# Patient Record
Sex: FEMALE | Race: WHITE | NOT HISPANIC OR LATINO | Employment: OTHER | URBAN - METROPOLITAN AREA
[De-identification: names, ages, dates, MRNs, and addresses within clinical notes are randomized per-mention and may not be internally consistent; named-entity substitution may affect disease eponyms.]

---

## 2020-06-01 PROBLEM — Z01.419 WELL WOMAN EXAM: Chronic | Status: ACTIVE | Noted: 2020-06-01

## 2023-05-12 ENCOUNTER — OFFICE VISIT (OUTPATIENT)
Dept: OBSTETRICS AND GYNECOLOGY | Facility: CLINIC | Age: 63
End: 2023-05-12
Payer: COMMERCIAL

## 2023-05-12 VITALS — WEIGHT: 124 LBS | BODY MASS INDEX: 21.28 KG/M2 | SYSTOLIC BLOOD PRESSURE: 90 MMHG | DIASTOLIC BLOOD PRESSURE: 70 MMHG

## 2023-05-12 DIAGNOSIS — Z12.31 BREAST CANCER SCREENING BY MAMMOGRAM: ICD-10-CM

## 2023-05-12 DIAGNOSIS — N39.41 URGE INCONTINENCE: ICD-10-CM

## 2023-05-12 DIAGNOSIS — Z01.419 WELL WOMAN EXAM WITH ROUTINE GYNECOLOGICAL EXAM: Primary | ICD-10-CM

## 2023-05-12 NOTE — PROGRESS NOTES
Subjective   Chief Complaint   Patient presents with   • Annual Exam     No c/c     Azeem Diego is a 62 y.o. year old  menopausal female presenting to be seen for her annual exam.  This past year she has not been on hormone replacement therapy.  There has not been vaginal bleeding in the last 12 months.  Menopausal symptoms are not present.    SEXUAL Hx:  She is not currently sexually active.  In the past year there she has not been sexually active.    Condoms are not needed because she is not sexually active.  She would not like to be screened for STD's at today's exam.  [place] is painful: n/a  HEALTH Hx:  She exercises regularly: yes.  She wears her seat belt: yes.  She has concerns about domestic violence: no.  OTHER THINGS SHE WANTS TO DISCUSS TODAY:  Nothing else    The following portions of the patient's history were reviewed and updated as appropriate:problem list, current medications, allergies, past family history, past medical history, past social history and past surgical history.    Social History    Tobacco Use      Smoking status: Never      Smokeless tobacco: Never      Review of Systems  Constitutional POS: nothing reported    NEG: anorexia or night sweats   Genitourinary POS: urge incontinence not affecting ADLs    NEG: dysuria or hematuria      Gastointestinal POS: nothing reported    NEG: bloating, change in bowel habits, melena or reflux symptoms   Integument POS: nothing reported    NEG: moles that are changing in size, shape, color or rashes   Breast POS: nothing reported    NEG: persistent breast lump, skin dimpling or nipple discharge        Objective   BP 90/70 (BP Location: Left arm, Patient Position: Sitting, Cuff Size: Adult)   Wt 56.2 kg (124 lb)   BMI 21.28 kg/m²     General:  well developed; well nourished  no acute distress   Skin:  No suspicious lesions seen   Thyroid: normal to inspection and palpation   Breasts:  Examined in supine position  Symmetric without  masses or skin dimpling  Nipples normal without inversion, lesions or discharge  There are no palpable axillary nodes   Abdomen: soft, non-tender; no masses  no umbilical or inguinal hernias are present  no hepato-splenomegaly   Pelvis: Clinical staff was present for exam  External genitalia:  normal appearance of the external genitalia including Bartholin's and Grandfield's glands.  :  urethral meatus normal;  Vaginal:  atrophic mucosal changes are present;  Cervix:  normal appearance.  Uterus:  normal size, shape and consistency.  Adnexa:  normal bimanual exam of the adnexa.  Rectal:  digital rectal exam not performed; anus visually normal appearing.  Cystocele GRADE 1  Rectocele GRADE 1        Assessment   1. Normal GYN exam in menopause  2. She is up to date on all relevant gynecologic and colorectal screenings except mammography   3. History of low bone mass with normal FRAX score in 2019  4. UUI not affecting ADLs     Plan   Pap was done today.  If she does not receive the results of the Pap within 2 weeks  time, she was instructed to call to find out the results.  I explained to Azeem that the recommendations for Pap smear interval in a low risk patient's has lengthened to 3 years time.  I encouraged her to be seen yearly for a full physical exam including breast and pelvic exam even during the off years when PAP's will not be performed.  She was encouraged to get yearly mammograms.  She should report any palpable breast lump(s) or skin changes regardless of mammographic findings.  I explained to Azeem that notification regarding her mammogram results will come from the center performing the study.  Our office will not be routinely calling with mammogram results.  It is her responsibility to make sure that the results from the mammogram are communicated to her by the breast center.  If she has any questions about the results, she is welcome to call our office anytime.  Colonoscopy UTD  DEXA per PCP per  patient  Reviewed the importance of exercise 2-3 times per week with at least 20-30 minutes of cardio  Reviewed timed voids in regards to urge urinary incontinence.  Reviewed good calcium and vitamin D in her diet and weightbearing exercises.  The importance of keeping all planned follow-up and taking all medications as prescribed was emphasized.  Follow up for annual exam in ~ one year    No orders of the defined types were placed in this encounter.         This note was electronically signed.    Yissel Payne MD  May 12, 2023

## 2023-05-17 LAB — REF LAB TEST METHOD: NORMAL

## 2024-05-20 ENCOUNTER — OFFICE VISIT (OUTPATIENT)
Dept: OBSTETRICS AND GYNECOLOGY | Facility: CLINIC | Age: 64
End: 2024-05-20
Payer: MEDICARE

## 2024-05-20 VITALS
BODY MASS INDEX: 21.8 KG/M2 | SYSTOLIC BLOOD PRESSURE: 116 MMHG | WEIGHT: 127 LBS | DIASTOLIC BLOOD PRESSURE: 70 MMHG | RESPIRATION RATE: 16 BRPM

## 2024-05-20 DIAGNOSIS — N39.41 URGE URINARY INCONTINENCE: ICD-10-CM

## 2024-05-20 DIAGNOSIS — Z01.419 ENCOUNTER FOR GYNECOLOGICAL EXAMINATION WITHOUT ABNORMAL FINDING: Primary | ICD-10-CM

## 2024-05-20 DIAGNOSIS — Z78.0 POST-MENOPAUSAL: ICD-10-CM

## 2024-05-20 PROCEDURE — G0101 CA SCREEN;PELVIC/BREAST EXAM: HCPCS

## 2024-05-20 NOTE — PROGRESS NOTES
Subjective   Chief Complaint   Patient presents with    Annual Exam     Azeem Diego is a 63 y.o. year old  menopausal female presenting to be seen for her annual exam.  This past year she has not been on hormone replacement therapy.  There has not been vaginal bleeding in the last 12 months.  Menopausal symptoms are not present.    SEXUAL Hx:  She is not currently sexually active.  In the past year there she has not been sexually active.    Condoms are not needed because she is not sexually active.  She would not like to be screened for STD's at today's exam.  [place] is painful: n/a  HEALTH Hx:  She exercises regularly: yes. She walks every day- had been playing tennis but is no longer able to with her knee  She wears her seat belt: yes.  She has concerns about domestic violence: no.  She has noticed changes in height: no.  OTHER THINGS SHE WANTS TO DISCUSS TODAY:  Nothing else    The following portions of the patient's history were reviewed and updated as appropriate:problem list, current medications, allergies, past family history, past medical history, past social history, and past surgical history.    Social History    Tobacco Use      Smoking status: Never      Smokeless tobacco: Never      Review of Systems   Constitutional: Negative.  Negative for appetite change and diaphoresis.   Cardiovascular: Negative.    Gastrointestinal: Negative.  Negative for abdominal distention, blood in stool, GERD and indigestion.   Endocrine: Negative.    Genitourinary:  Negative for breast discharge, breast lump, breast pain, dysuria, hematuria and vaginal bleeding.   Skin: Negative.           Objective   /70   Resp 16   Wt 57.6 kg (127 lb)   BMI 21.80 kg/m²     Physical Exam  Vitals reviewed. Exam conducted with a chaperone present.   Constitutional:       Appearance: Normal appearance. She is normal weight.   Cardiovascular:      Rate and Rhythm: Normal rate and regular rhythm.      Heart sounds:  Normal heart sounds.   Pulmonary:      Effort: Pulmonary effort is normal.      Breath sounds: Normal breath sounds.   Chest:   Breasts:     Right: Normal.      Left: Normal.   Abdominal:      General: Bowel sounds are normal.      Palpations: Abdomen is soft.   Genitourinary:     General: Normal vulva.      Exam position: Lithotomy position.      Vagina: Normal.      Cervix: Normal.      Uterus: Normal.       Adnexa: Right adnexa normal and left adnexa normal.      Rectum: Normal.      Comments: Rectal exam not done but appears normal visually  Neurological:      Mental Status: She is alert and oriented to person, place, and time.   Psychiatric:         Mood and Affect: Mood normal.         Behavior: Behavior normal.         Thought Content: Thought content normal.         Judgment: Judgment normal.            Diagnoses and all orders for this visit:    1. Encounter for gynecological examination without abnormal finding (Primary)    2. Post-menopausal  -     DEXA Bone Density Axial; Future    3. Urge urinary incontinence     She reports some improvement with her UUI with timed voiding. Discussed prior DEXA scan results and ensured patient is taking vitamin D3 as well as calcium. Will be in touch with results once she gets next one done.     She is up to date on all relevant gynecologic and colorectal screenings    The importance of keeping all planned follow-up and taking all medications as prescribed was emphasized.    Today I discussed with Azeem the total recommended calcium intake for a post-menopausal female is 1200 mg.  Ideally this should be from dietary sources.  I reviewed calcium content in various foods including milk, fortified orange juice and yogurt.  If she cannot get sufficient calcium through dietary means, it is recommended to supplement with either a multivitamin or calcium to reach her daily goal.  I also reviewed the difference in the bioavailability of calcium carbonate and calcium citrate  containing supplements and the importance of taking calcium carbonate containing products with food. Finally, vitamin D's role in calcium absorption was reviewed and a total daily vitamin D intake of 600 units was recommended.    Her vaccine record was reviewed and updated.    I discussed with Azeem that she may be behind on needed vaccinations for  vaccines are up to date .  She may be able to obtain these vaccinations at her local pharmacy OR speak about obtaining them with her primary care.  If she does obtain her vaccines, I have asked Azeem to let us know the date each vaccine was obtained so that her medical record could be updated in our system.    No orders of the defined types were placed in this encounter.           Return in about 1 year (around 5/20/2025) for Annual physical.    Olga Yang, APRN  May 24, 2024

## 2024-10-15 ENCOUNTER — OFFICE VISIT (OUTPATIENT)
Age: 64
End: 2024-10-15
Payer: MEDICARE

## 2024-10-15 ENCOUNTER — LAB (OUTPATIENT)
Facility: HOSPITAL | Age: 64
End: 2024-10-15
Payer: MEDICARE

## 2024-10-15 VITALS
WEIGHT: 125.8 LBS | SYSTOLIC BLOOD PRESSURE: 98 MMHG | BODY MASS INDEX: 21.48 KG/M2 | HEART RATE: 74 BPM | TEMPERATURE: 97.5 F | DIASTOLIC BLOOD PRESSURE: 68 MMHG | HEIGHT: 64 IN

## 2024-10-15 DIAGNOSIS — M15.0 PRIMARY OSTEOARTHRITIS INVOLVING MULTIPLE JOINTS: ICD-10-CM

## 2024-10-15 DIAGNOSIS — R53.83 FATIGUE, UNSPECIFIED TYPE: ICD-10-CM

## 2024-10-15 DIAGNOSIS — M25.50 ARTHRALGIA, UNSPECIFIED JOINT: ICD-10-CM

## 2024-10-15 DIAGNOSIS — M25.50 ARTHRALGIA, UNSPECIFIED JOINT: Primary | ICD-10-CM

## 2024-10-15 DIAGNOSIS — M15.0 PRIMARY OSTEOARTHRITIS INVOLVING MULTIPLE JOINTS: Chronic | ICD-10-CM

## 2024-10-15 LAB
BASOPHILS # BLD AUTO: 0.04 10*3/MM3 (ref 0–0.2)
BASOPHILS NFR BLD AUTO: 0.7 % (ref 0–1.5)
DEPRECATED RDW RBC AUTO: 43.1 FL (ref 37–54)
EOSINOPHIL # BLD AUTO: 0.08 10*3/MM3 (ref 0–0.4)
EOSINOPHIL NFR BLD AUTO: 1.5 % (ref 0.3–6.2)
ERYTHROCYTE [DISTWIDTH] IN BLOOD BY AUTOMATED COUNT: 12.3 % (ref 12.3–15.4)
ERYTHROCYTE [SEDIMENTATION RATE] IN BLOOD: 9 MM/HR (ref 0–30)
HCT VFR BLD AUTO: 38.1 % (ref 34–46.6)
HGB BLD-MCNC: 12.2 G/DL (ref 12–15.9)
IMM GRANULOCYTES # BLD AUTO: 0.02 10*3/MM3 (ref 0–0.05)
IMM GRANULOCYTES NFR BLD AUTO: 0.4 % (ref 0–0.5)
LYMPHOCYTES # BLD AUTO: 1.1 10*3/MM3 (ref 0.7–3.1)
LYMPHOCYTES NFR BLD AUTO: 20 % (ref 19.6–45.3)
MCH RBC QN AUTO: 30.3 PG (ref 26.6–33)
MCHC RBC AUTO-ENTMCNC: 32 G/DL (ref 31.5–35.7)
MCV RBC AUTO: 94.8 FL (ref 79–97)
MONOCYTES # BLD AUTO: 0.37 10*3/MM3 (ref 0.1–0.9)
MONOCYTES NFR BLD AUTO: 6.7 % (ref 5–12)
NEUTROPHILS NFR BLD AUTO: 3.89 10*3/MM3 (ref 1.7–7)
NEUTROPHILS NFR BLD AUTO: 70.7 % (ref 42.7–76)
NRBC BLD AUTO-RTO: 0 /100 WBC (ref 0–0.2)
PLATELET # BLD AUTO: 268 10*3/MM3 (ref 140–450)
PMV BLD AUTO: 9.8 FL (ref 6–12)
RBC # BLD AUTO: 4.02 10*6/MM3 (ref 3.77–5.28)
WBC NRBC COR # BLD AUTO: 5.5 10*3/MM3 (ref 3.4–10.8)

## 2024-10-15 PROCEDURE — 80053 COMPREHEN METABOLIC PANEL: CPT

## 2024-10-15 PROCEDURE — 86140 C-REACTIVE PROTEIN: CPT

## 2024-10-15 PROCEDURE — 86800 THYROGLOBULIN ANTIBODY: CPT

## 2024-10-15 PROCEDURE — 1160F RVW MEDS BY RX/DR IN RCRD: CPT | Performed by: INTERNAL MEDICINE

## 2024-10-15 PROCEDURE — 36415 COLL VENOUS BLD VENIPUNCTURE: CPT

## 2024-10-15 PROCEDURE — 85025 COMPLETE CBC W/AUTO DIFF WBC: CPT

## 2024-10-15 PROCEDURE — 86038 ANTINUCLEAR ANTIBODIES: CPT

## 2024-10-15 PROCEDURE — 82085 ASSAY OF ALDOLASE: CPT

## 2024-10-15 PROCEDURE — 99204 OFFICE O/P NEW MOD 45 MIN: CPT | Performed by: INTERNAL MEDICINE

## 2024-10-15 PROCEDURE — 86376 MICROSOMAL ANTIBODY EACH: CPT

## 2024-10-15 PROCEDURE — 84439 ASSAY OF FREE THYROXINE: CPT

## 2024-10-15 PROCEDURE — 83520 IMMUNOASSAY QUANT NOS NONAB: CPT

## 2024-10-15 PROCEDURE — 84443 ASSAY THYROID STIM HORMONE: CPT

## 2024-10-15 PROCEDURE — 85652 RBC SED RATE AUTOMATED: CPT

## 2024-10-15 PROCEDURE — 86431 RHEUMATOID FACTOR QUANT: CPT

## 2024-10-15 PROCEDURE — 1159F MED LIST DOCD IN RCRD: CPT | Performed by: INTERNAL MEDICINE

## 2024-10-15 PROCEDURE — 82550 ASSAY OF CK (CPK): CPT

## 2024-10-15 PROCEDURE — 86480 TB TEST CELL IMMUN MEASURE: CPT

## 2024-10-15 PROCEDURE — 80074 ACUTE HEPATITIS PANEL: CPT

## 2024-10-15 NOTE — PROGRESS NOTES
Office Visit       Date: 10/15/2024   Patient Name: Azeem Diego  MRN: [id number]  YOB: 1960    Referring Physician: Austyn Carney PA-C     Chief Complaint   Patient presents with    Joint Pain    Osteoarthritis       History of Present Illness: Azeem Diego is a 64 y.o. female who is here today at the request of Austyn Carney PA-C. The referral indicates that she has joint pain/hand pain. No muscle pain or weakness. No back or neck problems. No red or hot joints. No joint swelling at this moment. Her pain is worse at night. She takes Tylenol. She avoids ibuprofen/NSAID products due to concerns about GI issues. Her joints are bigger than yesterday.     No recent serious injuries or infections. No fever. She is fatigued. No history of uveitis, iritis, or scleritis. No oral, nasal, or genital ulcers. No history of gout, psoriasis, or Raynaud's. No sicca symptoms. No shortness of breath. No chest pain. No GI or  issues. No headaches or paresthesias. No hair loss. No rash. No lymphadenopathy. No abnormal bruising/bleeding.     Her grandmother and mother had arthritis.     Medication/treatment/interventions tried include: Tylenol, sertraline, physical therapy, Diclofenac gel, 1st [facility] joint injections (bilaterally), she reports she avoids oral NSAIDS due to concerns about GI issues, she sees a chiropractor        Subjective     Review of Systems   Constitutional:  Positive for fatigue.   HENT: Negative.     Eyes: Negative.    Respiratory: Negative.     Cardiovascular: Negative.    Gastrointestinal: Negative.    Endocrine: Negative.    Genitourinary: Negative.    Musculoskeletal:  Positive for arthralgias.   Skin: Negative.    Allergic/Immunologic: Negative.    Neurological: Negative.    Hematological: Negative.    Psychiatric/Behavioral: Negative.     All other systems reviewed and are negative.       Past Medical History:   Diagnosis Date    Anxiety     Arthritis     Glaucoma   "   Hot flashes     Hypertension        Past Surgical History:   Procedure Laterality Date    COLONOSCOPY      PELVIC LAPAROSCOPY         Family History   Problem Relation Age of Onset    Alzheimer's disease Mother     Cancer Father         Prostate    Prostate cancer Father     Atrial fibrillation Father     Breast cancer Neg Hx     Ovarian cancer Neg Hx     Uterine cancer Neg Hx     Colon cancer Neg Hx     Osteoporosis Neg Hx     Pancreatic cancer Neg Hx        Social History     Socioeconomic History    Marital status:    Tobacco Use    Smoking status: Never    Smokeless tobacco: Never   Vaping Use    Vaping status: Never Used   Substance and Sexual Activity    Alcohol use: Yes     Comment: social     Drug use: No    Sexual activity: Not Currently     Partners: Male     Birth control/protection: Post-menopausal         Current Outpatient Medications:     latanoprost (XALATAN) 0.005 % ophthalmic solution, Apply 1 drop to eye(s) as directed by provider Every Night., Disp: , Rfl:     lisinopril (PRINIVIL,ZESTRIL) 20 MG tablet, Take 1 tablet by mouth Daily., Disp: , Rfl:     sertraline (ZOLOFT) 50 MG tablet, Take 1 tablet by mouth Daily., Disp: , Rfl:     No Known Allergies    I reviewed the patient's chief complaint, history of present illness, review of systems, past medical history, surgical history, family history, social history, medications and allergy list.     Objective      Vitals:    10/15/24 0923   BP: 98/68   BP Location: Left arm   Patient Position: Sitting   Cuff Size: Adult   Pulse: 74   Temp: 97.5 °F (36.4 °C)   Weight: 57.1 kg (125 lb 12.8 oz)   Height: 162.6 cm (64.02\")   PainSc:   5     Body mass index is 21.58 kg/m².       Physical Exam     General: Well appearing 64 year old  female. Not in distress. She is ambulating unassisted.   SKIN: No rashes. No alopecia. No subcutaneous nodules. No digital pits or ulcers. No sclerodactyly.   HEENT: NCAT. Conjunctiva clear, no photophobia. No " oral or nasal ulcers. Hearing intact.    Pulmonary: Clear to auscultation bilaterally. No wheezing, rales, or rhonchi.  CV: Regular rate and rhythm. No murmurs, rubs, or gallops.   Psych: Normal mood and affect. Alert and oriented x 3.   Extremities: No cyanosis or edema.   Musculoskeletal: No joint swelling or tenderness to palpation. No warmth or erythema. Normal range of motion of the wrists, ankles, elbows, and knees.   Lymph: No palpable cervical adenopathy      Procedures    Assessment / Plan      Assessment & Plan  Arthralgia, unspecified joint  At age 64 she most certainly has some osteoarthritis. See below.   We will evaluate further for autoimmune disease/inflammatory types of arthritis.   We gave her a handout on hand pain to take home and review.   Follow up in 3 months.   Fatigue, unspecified type    Labs and x-rays to evaluate for autoimmune conditions. We usually contact patients with these results within 10-14 business days    Primary osteoarthritis involving multiple joints  Tylenol PRN is ok as directed  Her PCP prescribed her meloxicam   She has tried using diclofenac gel PRN  She has done some physical therapy in the past   She has seen a chiropractor     Orders Placed This Encounter   Procedures    XR Hand 2 View Bilateral    QuantiFERON-TB Gold Plus    Hepatitis Panel, Acute    14.3.3 ETA, Rheum. Arthritis    ADRY 12 Plus Profile (RDL)    ADRY by IFA, Reflex to Titer and Pattern    CBC Auto Differential    CK    Comprehensive Metabolic Panel    C-reactive Protein    Cyclic Citrul Peptide Antibody, IgG / IgA    HLA-B27 Antigen    RF Isotypes, IgG, IgA, IgM EIA    Sedimentation Rate    Thyroid Antibodies    TSH+Free T4    Aldolase            Follow Up:   Return in about 3 months (around 1/15/2025).    Micah Hatch DO  [facility]

## 2024-10-15 NOTE — ASSESSMENT & PLAN NOTE
Tylenol PRN is ok as directed  Her PCP prescribed her meloxicam   She has tried using diclofenac gel PRN  She has done some physical therapy in the past   She has seen a chiropractor

## 2024-10-16 ENCOUNTER — PATIENT OUTREACH (OUTPATIENT)
Age: 64
End: 2024-10-16
Payer: MEDICARE

## 2024-10-16 LAB
ALBUMIN SERPL-MCNC: 4.3 G/DL (ref 3.5–5.2)
ALBUMIN/GLOB SERPL: 1.6 G/DL
ALP SERPL-CCNC: 49 U/L (ref 39–117)
ALT SERPL W P-5'-P-CCNC: 11 U/L (ref 1–33)
ANION GAP SERPL CALCULATED.3IONS-SCNC: 9.5 MMOL/L (ref 5–15)
AST SERPL-CCNC: 15 U/L (ref 1–32)
BILIRUB SERPL-MCNC: 0.4 MG/DL (ref 0–1.2)
BUN SERPL-MCNC: 11 MG/DL (ref 8–23)
BUN/CREAT SERPL: 12.6 (ref 7–25)
CALCIUM SPEC-SCNC: 9.6 MG/DL (ref 8.6–10.5)
CHLORIDE SERPL-SCNC: 96 MMOL/L (ref 98–107)
CK SERPL-CCNC: 85 U/L (ref 20–180)
CO2 SERPL-SCNC: 24.5 MMOL/L (ref 22–29)
CREAT SERPL-MCNC: 0.87 MG/DL (ref 0.57–1)
CRP SERPL-MCNC: <0.3 MG/DL (ref 0–0.5)
EGFRCR SERPLBLD CKD-EPI 2021: 73.6 ML/MIN/1.73
GLOBULIN UR ELPH-MCNC: 2.7 GM/DL
GLUCOSE SERPL-MCNC: 89 MG/DL (ref 65–99)
HAV IGM SERPL QL IA: NORMAL
HBV CORE IGM SERPL QL IA: NORMAL
HBV SURFACE AG SERPL QL IA: NORMAL
HCV AB SER QL: NORMAL
POTASSIUM SERPL-SCNC: 4.5 MMOL/L (ref 3.5–5.2)
PROT SERPL-MCNC: 7 G/DL (ref 6–8.5)
SODIUM SERPL-SCNC: 130 MMOL/L (ref 136–145)
T4 FREE SERPL-MCNC: 1.21 NG/DL (ref 0.92–1.68)
TSH SERPL DL<=0.05 MIU/L-ACNC: 0.61 UIU/ML (ref 0.27–4.2)

## 2024-10-17 LAB
ALDOLASE SERPL-CCNC: 3.1 U/L (ref 3.3–10.3)
THYROGLOB AB SERPL-ACNC: <1 IU/ML (ref 0–0.9)
THYROPEROXIDASE AB SERPL-ACNC: 15 IU/ML (ref 0–34)

## 2024-10-18 LAB
GAMMA INTERFERON BACKGROUND BLD IA-ACNC: 0.06 IU/ML
M TB IFN-G BLD-IMP: NEGATIVE
M TB IFN-G CD4+ BCKGRND COR BLD-ACNC: 0.07 IU/ML
M TB IFN-G CD4+CD8+ BCKGRND COR BLD-ACNC: 0.06 IU/ML
MITOGEN IGNF BCKGRD COR BLD-ACNC: >10 IU/ML
QUANTIFERON INCUBATION: NORMAL
SERVICE CMNT-IMP: NORMAL

## 2024-10-23 LAB — HLA-B27 QL NAA+PROBE: NEGATIVE

## 2024-10-24 LAB — 14-3-3 ETA AG SER IA-MCNC: <0.2 NG/ML

## 2024-10-28 LAB
ANA SER QL IF: NEGATIVE
ENA SS-A AB SER IA-ACNC: <20 UNITS

## 2024-10-29 LAB
RF IGA SER-ACNC: <7 U
RF IGG SER-ACNC: <7 U
RF IGM SER IA-ACNC: <7 U

## 2025-06-30 ENCOUNTER — TRANSCRIBE ORDERS (OUTPATIENT)
Dept: ADMINISTRATIVE | Facility: HOSPITAL | Age: 65
End: 2025-06-30
Payer: MEDICARE

## 2025-06-30 DIAGNOSIS — Z12.39 ENCOUNTER FOR OTHER SCREENING FOR MALIGNANT NEOPLASM OF BREAST: Primary | ICD-10-CM

## 2025-07-07 LAB
NCCN CRITERIA FLAG: NORMAL
TYRER CUZICK SCORE: 6.6

## 2025-07-10 ENCOUNTER — HOSPITAL ENCOUNTER (OUTPATIENT)
Dept: MAMMOGRAPHY | Facility: HOSPITAL | Age: 65
Discharge: HOME | End: 2025-07-10
Admitting: INTERNAL MEDICINE
Payer: MEDICARE

## 2025-07-10 DIAGNOSIS — Z12.39 ENCOUNTER FOR OTHER SCREENING FOR MALIGNANT NEOPLASM OF BREAST: ICD-10-CM

## 2025-07-10 PROCEDURE — 77067 SCR MAMMO BI INCL CAD: CPT

## 2025-07-10 PROCEDURE — 77063 BREAST TOMOSYNTHESIS BI: CPT
